# Patient Record
Sex: MALE | Race: WHITE | NOT HISPANIC OR LATINO | Employment: FULL TIME | URBAN - METROPOLITAN AREA
[De-identification: names, ages, dates, MRNs, and addresses within clinical notes are randomized per-mention and may not be internally consistent; named-entity substitution may affect disease eponyms.]

---

## 2024-05-01 ENCOUNTER — TELEPHONE (OUTPATIENT)
Age: 34
End: 2024-05-01

## 2024-05-01 NOTE — TELEPHONE ENCOUNTER
PT call back in regards to coverage. PT at this time is self pay and does not have coverage. Thank you.

## 2024-05-02 ENCOUNTER — OFFICE VISIT (OUTPATIENT)
Dept: FAMILY MEDICINE CLINIC | Facility: CLINIC | Age: 34
End: 2024-05-02

## 2024-05-02 VITALS
OXYGEN SATURATION: 97 % | HEIGHT: 73 IN | WEIGHT: 256 LBS | TEMPERATURE: 97.3 F | HEART RATE: 57 BPM | RESPIRATION RATE: 16 BRPM | DIASTOLIC BLOOD PRESSURE: 70 MMHG | SYSTOLIC BLOOD PRESSURE: 120 MMHG | BODY MASS INDEX: 33.93 KG/M2

## 2024-05-02 DIAGNOSIS — M25.552 PAIN OF LEFT HIP: ICD-10-CM

## 2024-05-02 DIAGNOSIS — Z11.4 SCREENING FOR HIV (HUMAN IMMUNODEFICIENCY VIRUS): ICD-10-CM

## 2024-05-02 DIAGNOSIS — Z00.00 HEALTHCARE MAINTENANCE: ICD-10-CM

## 2024-05-02 DIAGNOSIS — M54.50 LUMBAR BACK PAIN: ICD-10-CM

## 2024-05-02 DIAGNOSIS — Z11.59 NEED FOR HEPATITIS C SCREENING TEST: Primary | ICD-10-CM

## 2024-05-02 DIAGNOSIS — Z13.6 SCREENING FOR CARDIOVASCULAR CONDITION: ICD-10-CM

## 2024-05-02 PROBLEM — F10.10 EXCESSIVE DRINKING ALCOHOL: Status: RESOLVED | Noted: 2019-05-24 | Resolved: 2024-05-02

## 2024-05-02 PROBLEM — I10 HYPERTENSION: Status: RESOLVED | Noted: 2019-05-24 | Resolved: 2024-05-02

## 2024-05-02 PROBLEM — G47.33 MILD OBSTRUCTIVE SLEEP APNEA: Status: RESOLVED | Noted: 2020-06-18 | Resolved: 2024-05-02

## 2024-05-02 PROBLEM — J45.20 ASTHMA, MILD INTERMITTENT: Status: ACTIVE | Noted: 2019-11-14

## 2024-05-02 PROBLEM — I10 HYPERTENSION: Status: ACTIVE | Noted: 2019-05-24

## 2024-05-02 PROBLEM — F10.10 EXCESSIVE DRINKING ALCOHOL: Status: ACTIVE | Noted: 2019-05-24

## 2024-05-02 PROBLEM — G47.33 MILD OBSTRUCTIVE SLEEP APNEA: Status: ACTIVE | Noted: 2020-06-18

## 2024-05-02 PROBLEM — J45.20 ASTHMA, MILD INTERMITTENT: Status: RESOLVED | Noted: 2019-11-14 | Resolved: 2024-05-02

## 2024-05-02 PROCEDURE — 99204 OFFICE O/P NEW MOD 45 MIN: CPT | Performed by: NURSE PRACTITIONER

## 2024-05-02 NOTE — PROGRESS NOTES
INTERNAL MEDICINE INITIAL OFFICE VISIT  [facility] - [facility]    NAME: Arnav Woods  AGE: 34 y.o. SEX: male  : 1990     DATE: 2024     Assessment and Plan:     Problem List Items Addressed This Visit        Surgery/Wound/Pain    Pain of left hip     History of left hip pain off and on.  Saw a chiropractor many years ago.  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 Left hip x ray ordered. May need physical therapy or orthopedics evaluation          Relevant Orders    XR hip/pelv 2-3 vws left if performed    Lumbar back pain     Left sided lumbar back pain which fluctuates. No mechanism of injury. Does not take any medication for pain. Is active with both work and does go to the gym.  Lumbar x ray ordered. May need physical therapy or orthopedic evaluation         Relevant Orders    XR spine lumbar minimum 4 views non injury   Other Visit Diagnoses     Need for hepatitis C screening test    -  Primary    Relevant Orders    Hepatitis C Antibody    Screening for HIV (human immunodeficiency virus)        Relevant Orders    HIV 1/2 AG/AB w Reflex [facility] for 2 yr old and above    Screening for cardiovascular condition        Relevant Orders    Lipid Panel with Direct LDL reflex    Healthcare maintenance        Relevant Orders    CBC and differential    Basic metabolic panel          No follow-ups on file.     Chief Complaint:     Chief Complaint   Patient presents with   • Establish Care   • Hip Pain     Left hip pain      History of Present Illness:   Arnav presents to the office today to establish care.  His past medical history was updated.  The patient has 2 complaints of left lumbar back pain and left hip pain.  Both of these are chronic in nature.  He denies any mechanism of injury.  He has seen a chiropractor in the distant past.  He is not taking any medications for this.  He is quite active with work and also goes to the gym.  X-rays recommended.   Surveillance blood work ordered for screening.  I will contact the patient with the x-ray results to determine the next step.      The following portions of the patient's history were reviewed and updated as appropriate: allergies, current medications, past family history, past medical history, past social history, past surgical history and problem list.     Review of Systems:     Review of Systems   Constitutional: Negative.  Negative for fatigue.   HENT: Negative.  Negative for congestion, postnasal drip, rhinorrhea and trouble swallowing.    Eyes: Negative.  Negative for visual disturbance.   Respiratory: Negative.  Negative for choking and shortness of breath.    Cardiovascular: Negative.  Negative for chest pain.   Gastrointestinal: Negative.    Endocrine: Negative.    Genitourinary: Negative.    Musculoskeletal:  Positive for arthralgias and back pain. Negative for myalgias and neck pain.   Skin: Negative.    Neurological:  Negative for dizziness and headaches.   Psychiatric/Behavioral: Negative.          Past Medical History:     Past Medical History:   Diagnosis Date   • Anxiety    • Lyme disease         Past Surgical History:     Past Surgical History:   Procedure Laterality Date   • NO PAST SURGERIES          Social History:     Social History     Socioeconomic History   • Marital status: Single     Spouse name: None   • Number of children: None   • Years of education: None   • Highest education level: None   Occupational History   • None   Tobacco Use   • Smoking status: Never   • Smokeless tobacco: Never   Vaping Use   • Vaping status: Former   Substance and Sexual Activity   • Alcohol use: Not Currently     Comment: former   • Drug use: Not Currently   • Sexual activity: Yes     Partners: Female   Other Topics Concern   • None   Social History Narrative   • None     Social Determinants of Health     Financial Resource Strain: Not on file   Food Insecurity: Not on file   Transportation Needs: Not on file  "  Physical Activity: Not on file   Stress: Not on file   Social Connections: Not on file   Intimate Partner Violence: Not on file   Housing Stability: Not on file         Family History:     Family History   Problem Relation Age of Onset   • No Known Problems Mother    • Schizophrenia Father         Current Medications:   No current outpatient medications on file.     Allergies:     Allergies   Allergen Reactions   • Levofloxacin Other (See Comments)     dizzziness   • Pollen Extract Allergic Rhinitis        Physical Exam:     /70   Pulse 57   Temp (!) 97.3 °F (36.3 °C) (Temporal)   Resp 16   Ht 6' 1\" (1.854 m)   Wt 116 kg (256 lb)   SpO2 97%   BMI 33.78 kg/m²     Physical Exam  Vitals reviewed.   Constitutional:       General: He is not in acute distress.     Appearance: Normal appearance. He is well-developed. He is obese.   HENT:      Head: Normocephalic and atraumatic.      Right Ear: External ear normal.      Left Ear: External ear normal.      Nose: Nose normal.      Mouth/Throat:      Mouth: Mucous membranes are moist.      Pharynx: Oropharynx is clear. No oropharyngeal exudate.   Eyes:      General:         Right eye: No discharge.         Left eye: No discharge.      Conjunctiva/sclera: Conjunctivae normal.      Pupils: Pupils are equal, round, and reactive to light.   Neck:      Thyroid: No thyromegaly.      Vascular: No JVD.      Trachea: No tracheal deviation.   Cardiovascular:      Rate and Rhythm: Normal rate and regular rhythm.      Pulses: Normal pulses.      Heart sounds: Normal heart sounds. No murmur heard.  Pulmonary:      Effort: Pulmonary effort is normal. No respiratory distress.      Breath sounds: Normal breath sounds. No wheezing.   Abdominal:      General: Bowel sounds are normal.      Palpations: Abdomen is soft.      Tenderness: There is no abdominal tenderness.   Musculoskeletal:      Cervical back: Normal range of motion and neck supple.      Lumbar back: Tenderness " present. Positive left straight leg raise test.      Right hip: Normal.      Left hip: Tenderness and crepitus present. Decreased range of motion.   Lymphadenopathy:      Cervical: No cervical adenopathy.   Skin:     General: Skin is warm and dry.      Capillary Refill: Capillary refill takes less than 2 seconds.   Neurological:      General: No focal deficit present.      Mental Status: He is alert and oriented to person, place, and time. Mental status is at baseline.   Psychiatric:         Mood and Affect: Mood normal.         Behavior: Behavior normal.         Thought Content: Thought content normal.         Judgment: Judgment normal.         Depression Screening and Follow-up Plan: Patient was screened for depression during today's encounter. They screened negative with a PHQ-2 score of 0.      Patient Instructions    website with CLAUDE Santacruz  [facility]

## 2024-05-02 NOTE — ASSESSMENT & PLAN NOTE
History of left hip pain off and on.  Saw a chiropractor many years ago.  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 Left hip x ray ordered. May need physical therapy or orthopedics evaluation

## 2024-05-02 NOTE — ASSESSMENT & PLAN NOTE
Left sided lumbar back pain which fluctuates. No mechanism of injury. Does not take any medication for pain. Is active with both work and does go to the gym.  Lumbar x ray ordered. May need physical therapy or orthopedic evaluation

## 2024-06-19 DIAGNOSIS — Z00.6 ENCOUNTER FOR EXAMINATION FOR NORMAL COMPARISON OR CONTROL IN CLINICAL RESEARCH PROGRAM: ICD-10-CM

## 2024-09-16 ENCOUNTER — HOSPITAL ENCOUNTER (EMERGENCY)
Facility: HOSPITAL | Age: 34
Discharge: HOME | End: 2024-09-16
Attending: EMERGENCY MEDICINE

## 2024-09-16 ENCOUNTER — APPOINTMENT (EMERGENCY)
Dept: RADIOLOGY | Facility: HOSPITAL | Age: 34
End: 2024-09-16

## 2024-09-16 VITALS
TEMPERATURE: 98.2 F | RESPIRATION RATE: 18 BRPM | SYSTOLIC BLOOD PRESSURE: 156 MMHG | HEART RATE: 90 BPM | OXYGEN SATURATION: 98 % | DIASTOLIC BLOOD PRESSURE: 100 MMHG

## 2024-09-16 DIAGNOSIS — S93.401A RIGHT ANKLE SPRAIN: Primary | ICD-10-CM

## 2024-09-16 PROCEDURE — 99283 EMERGENCY DEPT VISIT LOW MDM: CPT

## 2024-09-16 PROCEDURE — 73630 X-RAY EXAM OF FOOT: CPT

## 2024-09-16 PROCEDURE — 99284 EMERGENCY DEPT VISIT MOD MDM: CPT | Performed by: EMERGENCY MEDICINE

## 2024-09-16 PROCEDURE — 73610 X-RAY EXAM OF ANKLE: CPT

## 2024-09-16 NOTE — ED ATTENDING ATTESTATION
9/16/2024  I, Shelton Laird MD, saw and evaluated the patient. I have discussed the patient with the resident/non-physician practitioner and agree with the resident's/non-physician practitioner's findings, Plan of Care, and MDM as documented in the resident's/non-physician practitioner's note, except where noted. All available labs and Radiology studies were reviewed.  I was present for key portions of any procedure(s) performed by the resident/non-physician practitioner and I was immediately available to provide assistance.       At this point I agree with the current assessment done in the Emergency Department.  I have conducted an independent evaluation of this patient a history and physical is as follows:    34-year-old male presents to the emergency department for evaluation of right ankle pain.  Patient states he twisted his ankle and since then has had pain in the lateral aspect of the ankle as well as pain with ambulation.  No associated numbness or tingling.  No proximal pain.  No other injuries.    On exam, patient was comfortably in bed in no acute distress, head is normocephalic atraumatic, pupils equal round and reactive, heart is regular rate and rhythm with intact distal pulses, no increased work of breathing, respiratory distress, or stridor.  He has tenderness to palpation of the lateral malleolus of the right ankle, no obvious deformity, no tenderness to palpation of the fifth metatarsal, no proximal tib-fib tenderness.  Achilles tendon is intact.    Differential diagnosis includes but is not limited to sprain, fracture, dislocation.  Will get x-ray.    X-ray negative for any acute osseous abnormality per my interpretation, patient be discharged home.    ED Course         Critical Care Time  Procedures      
Yes - the patient is able to be screened

## 2024-09-16 NOTE — DISCHARGE INSTRUCTIONS
Please return to the emergency department if you develop new or worsening symptoms including fever, chest pain, shortness of breath, nausea and vomiting that does not resolve on its own.    Please follow-up with your primary care provider for additional care and management of your R ankle sprain.     Please continue to take your home medications as prescribed, please use Motrin / Tylenol as needed for pain control, please use ice for relief of swelling.

## 2024-09-16 NOTE — ED PROVIDER NOTES
1. Right ankle sprain      ED Disposition       ED Disposition   Discharge    Condition   Stable    Date/Time   Mon Sep 16, 2024  6:37 AM    Comment   Arnav Woods discharge to home/self care.                   Assessment & Plan       Medical Decision Making  Patient is a 34 y.o. male with PMH of nothing pertinent who presents to the ED with complaint of ankle sprain.    Vital signs on arrival within normal. On exam patient is seen ambulating with a surgical boot supplied by his girlfriend and crutches supplied by his girlfriend, alert, oriented, no evidence respiratory distress, right ankle with evidence of swelling over the lateral malleoli, no tenderness to palpation of the midfoot, sensation intact, strength intact within the right lower extremity.    History and physical exam most consistent with inversion ankle sprain of the right lower extremity. However, differential diagnosis included but not limited to fracture of the tibia or fibula, fracture of the midfoot, fracture of the ankle. Plan x-ray right ankle, x-ray right foot, pain control as needed    View ED course above for further discussion on patient workup.     X-ray without evidence of acute osseous abnormality, patient remains without loss of strength or sensation, no new onset bruising or worsening swelling    All labs reviewed and utilized in the medical decision making process  All radiology studies independently viewed by me and interpreted by the radiologist.  I reviewed all testing with the patient.     Upon re-evaluation patient has no active pain complaint, denying need for p.o. pain medications, patient has his own cast boot, states he will continue to use it, has his own crutches, states he will continue to use them.    Return precautions given, PCP follow-up recommended.      Amount and/or Complexity of Data Reviewed  Radiology: ordered.                       Medications - No data to display    History of Present Illness       Patient  is a 34-year-old male with no pertinent past medical history who presents emergency department for complaint of a fall.  Reports that last night around 6 PM he was outside attempting to water the grass, reports that he was going down a staircase, slipped/missed a step, suffered an inversion ankle sprain injury.  Reports presentation the emergency department today for worsening pain, concern for possible fracture, states that over the course the evening it swelled, there is no bruising, reports no loss of strength or sensation, able to ambulate with use of crutches, has not used any Motrin or Tylenol denies need for Motrin and Tylenol.            Review of Systems        Objective     ED Triage Vitals [09/16/24 0555]   Temperature Pulse Blood Pressure Respirations SpO2 Patient Position - Orthostatic VS   98.2 °F (36.8 °C) 90 156/100 18 98 % Sitting      Temp Source Heart Rate Source BP Location FiO2 (%) Pain Score    Oral Monitor Right arm -- 7        Physical Exam  Vitals and nursing note reviewed.   Constitutional:       General: He is not in acute distress.     Appearance: He is well-developed.   HENT:      Head: Normocephalic and atraumatic.   Eyes:      Conjunctiva/sclera: Conjunctivae normal.   Cardiovascular:      Rate and Rhythm: Normal rate and regular rhythm.      Heart sounds: No murmur heard.  Pulmonary:      Effort: Pulmonary effort is normal. No respiratory distress.      Breath sounds: Normal breath sounds. No wheezing, rhonchi or rales.   Abdominal:      Palpations: Abdomen is soft.      Tenderness: There is no abdominal tenderness. There is no guarding or rebound.   Musculoskeletal:         General: Swelling, tenderness and signs of injury present.      Cervical back: Neck supple.      Comments: Swelling over the lateral malleoli, pulses intact, strength intact, sensation intact, no tenderness to palpation to midfoot, no tenderness to palpation over the tibia/fibula, no evidence of bruising   Skin:      General: Skin is warm and dry.      Capillary Refill: Capillary refill takes less than 2 seconds.   Neurological:      Mental Status: He is alert.   Psychiatric:         Mood and Affect: Mood normal.         Labs Reviewed - No data to display  XR ankle 3+ views RIGHT    (Results Pending)   XR foot 3+ views RIGHT    (Results Pending)       Procedures         Chet Grimes DO  09/16/24 0641

## 2025-02-03 ENCOUNTER — HOSPITAL ENCOUNTER (OUTPATIENT)
Dept: RADIOLOGY | Facility: HOSPITAL | Age: 35
Discharge: HOME | End: 2025-02-03
Payer: COMMERCIAL

## 2025-02-03 ENCOUNTER — APPOINTMENT (OUTPATIENT)
Dept: LAB | Facility: CLINIC | Age: 35
End: 2025-02-03
Payer: COMMERCIAL

## 2025-02-03 ENCOUNTER — OFFICE VISIT (OUTPATIENT)
Dept: FAMILY MEDICINE CLINIC | Facility: CLINIC | Age: 35
End: 2025-02-03
Payer: COMMERCIAL

## 2025-02-03 VITALS
TEMPERATURE: 98 F | OXYGEN SATURATION: 98 % | SYSTOLIC BLOOD PRESSURE: 118 MMHG | DIASTOLIC BLOOD PRESSURE: 72 MMHG | RESPIRATION RATE: 18 BRPM | HEART RATE: 51 BPM | HEIGHT: 73 IN | BODY MASS INDEX: 36.29 KG/M2 | WEIGHT: 273.8 LBS

## 2025-02-03 DIAGNOSIS — M54.50 LUMBAR BACK PAIN: ICD-10-CM

## 2025-02-03 DIAGNOSIS — Z11.4 SCREENING FOR HIV (HUMAN IMMUNODEFICIENCY VIRUS): ICD-10-CM

## 2025-02-03 DIAGNOSIS — M25.552 PAIN OF LEFT HIP: ICD-10-CM

## 2025-02-03 DIAGNOSIS — Z11.59 NEED FOR HEPATITIS C SCREENING TEST: ICD-10-CM

## 2025-02-03 DIAGNOSIS — Z00.00 HEALTHCARE MAINTENANCE: ICD-10-CM

## 2025-02-03 DIAGNOSIS — M25.532 PAIN IN BOTH WRISTS: ICD-10-CM

## 2025-02-03 DIAGNOSIS — Z13.6 SCREENING FOR CARDIOVASCULAR CONDITION: ICD-10-CM

## 2025-02-03 DIAGNOSIS — Z13.29 SCREENING FOR THYROID DISORDER: ICD-10-CM

## 2025-02-03 DIAGNOSIS — Z13.1 SCREENING FOR DIABETES MELLITUS: ICD-10-CM

## 2025-02-03 DIAGNOSIS — M25.531 PAIN IN BOTH WRISTS: ICD-10-CM

## 2025-02-03 DIAGNOSIS — Z00.6 ENCOUNTER FOR EXAMINATION FOR NORMAL COMPARISON OR CONTROL IN CLINICAL RESEARCH PROGRAM: ICD-10-CM

## 2025-02-03 DIAGNOSIS — Z13.29 SCREENING FOR THYROID DISORDER: Primary | ICD-10-CM

## 2025-02-03 LAB
ANION GAP SERPL CALCULATED.3IONS-SCNC: 7 MMOL/L (ref 4–13)
BASOPHILS # BLD AUTO: 0.04 THOUSANDS/ΜL (ref 0–0.1)
BASOPHILS NFR BLD AUTO: 1 % (ref 0–1)
BUN SERPL-MCNC: 11 MG/DL (ref 5–25)
CALCIUM SERPL-MCNC: 9.6 MG/DL (ref 8.4–10.2)
CHLORIDE SERPL-SCNC: 105 MMOL/L (ref 96–108)
CHOLEST SERPL-MCNC: 176 MG/DL (ref ?–200)
CO2 SERPL-SCNC: 30 MMOL/L (ref 21–32)
CREAT SERPL-MCNC: 0.86 MG/DL (ref 0.6–1.3)
EOSINOPHIL # BLD AUTO: 0.19 THOUSAND/ΜL (ref 0–0.61)
EOSINOPHIL NFR BLD AUTO: 3 % (ref 0–6)
ERYTHROCYTE [DISTWIDTH] IN BLOOD BY AUTOMATED COUNT: 11.8 % (ref 11.6–15.1)
EST. AVERAGE GLUCOSE BLD GHB EST-MCNC: 114 MG/DL
GFR SERPL CREATININE-BSD FRML MDRD: 113 ML/MIN/1.73SQ M
GLUCOSE P FAST SERPL-MCNC: 80 MG/DL (ref 65–99)
HBA1C MFR BLD: 5.6 %
HCT VFR BLD AUTO: 40.5 % (ref 36.5–49.3)
HCV AB SER QL: NORMAL
HDLC SERPL-MCNC: 29 MG/DL
HGB BLD-MCNC: 13.8 G/DL (ref 12–17)
HIV 1+2 AB+HIV1 P24 AG SERPL QL IA: NORMAL
HIV 2 AB SERPL QL IA: NORMAL
HIV1 AB SERPL QL IA: NORMAL
HIV1 P24 AG SERPL QL IA: NORMAL
IMM GRANULOCYTES # BLD AUTO: 0.01 THOUSAND/UL (ref 0–0.2)
IMM GRANULOCYTES NFR BLD AUTO: 0 % (ref 0–2)
LDLC SERPL CALC-MCNC: 128 MG/DL (ref 0–100)
LYMPHOCYTES # BLD AUTO: 2.39 THOUSANDS/ΜL (ref 0.6–4.47)
LYMPHOCYTES NFR BLD AUTO: 41 % (ref 14–44)
MCH RBC QN AUTO: 30.5 PG (ref 26.8–34.3)
MCHC RBC AUTO-ENTMCNC: 34.1 G/DL (ref 31.4–37.4)
MCV RBC AUTO: 90 FL (ref 82–98)
MONOCYTES # BLD AUTO: 0.39 THOUSAND/ΜL (ref 0.17–1.22)
MONOCYTES NFR BLD AUTO: 7 % (ref 4–12)
NEUTROPHILS # BLD AUTO: 2.87 THOUSANDS/ΜL (ref 1.85–7.62)
NEUTS SEG NFR BLD AUTO: 48 % (ref 43–75)
NRBC BLD AUTO-RTO: 0 /100 WBCS
PLATELET # BLD AUTO: 267 THOUSANDS/UL (ref 149–390)
PMV BLD AUTO: 9.8 FL (ref 8.9–12.7)
POTASSIUM SERPL-SCNC: 4.8 MMOL/L (ref 3.5–5.3)
RBC # BLD AUTO: 4.52 MILLION/UL (ref 3.88–5.62)
SODIUM SERPL-SCNC: 142 MMOL/L (ref 135–147)
TRIGL SERPL-MCNC: 94 MG/DL (ref ?–150)
TSH SERPL DL<=0.05 MIU/L-ACNC: 1.41 UIU/ML (ref 0.45–4.5)
WBC # BLD AUTO: 5.89 THOUSAND/UL (ref 4.31–10.16)

## 2025-02-03 PROCEDURE — 80061 LIPID PANEL: CPT

## 2025-02-03 PROCEDURE — 73502 X-RAY EXAM HIP UNI 2-3 VIEWS: CPT

## 2025-02-03 PROCEDURE — 87389 HIV-1 AG W/HIV-1&-2 AB AG IA: CPT

## 2025-02-03 PROCEDURE — 99214 OFFICE O/P EST MOD 30 MIN: CPT | Performed by: NURSE PRACTITIONER

## 2025-02-03 PROCEDURE — 36415 COLL VENOUS BLD VENIPUNCTURE: CPT

## 2025-02-03 PROCEDURE — 99395 PREV VISIT EST AGE 18-39: CPT | Performed by: NURSE PRACTITIONER

## 2025-02-03 PROCEDURE — 86803 HEPATITIS C AB TEST: CPT

## 2025-02-03 PROCEDURE — 83036 HEMOGLOBIN GLYCOSYLATED A1C: CPT

## 2025-02-03 PROCEDURE — 80048 BASIC METABOLIC PNL TOTAL CA: CPT

## 2025-02-03 PROCEDURE — 84443 ASSAY THYROID STIM HORMONE: CPT

## 2025-02-03 PROCEDURE — 85025 COMPLETE CBC W/AUTO DIFF WBC: CPT

## 2025-02-03 PROCEDURE — 72110 X-RAY EXAM L-2 SPINE 4/>VWS: CPT

## 2025-02-03 NOTE — PATIENT INSTRUCTIONS
Wegovy or zepbound        Patient Education     Carpal Tunnel Exercises   About this topic   Carpal tunnel syndrome is a very common health problem. It is most often caused by doing hand or wrist movements over and over. It can also be caused by using the lower arm muscles too much.  The carpal tunnel is the small area in your wrist that your median nerve runs through. A tough band of tissues called a ligament holds everything in place over the carpal tunnel. Your median nerve runs from your neck through your lower arm into your hand. If this nerve is squeezed at the wrist area, you may feel pain and have other signs. Your hand, fingers, and wrist may feel weak, numb, or tingly. This is called carpal tunnel syndrome.  Your doctor may want you to try exercises to help your signs. Other times, you will do these exercises after surgery.  General   Before starting with a program, ask your doctor if you are healthy enough to do these exercises. Your doctor may have you work with a  or physical therapist to make a safe exercise program to meet your needs.  Stretching Exercises   Stretching exercises keep your muscles flexible. They also stop them from getting tight. Start by doing each of these stretches 2 to 3 times. In order for your body to make changes, you will need to hold these stretches for 20 to 30 seconds. Repeat each exercise 2 to 3 times each day. Do all exercises slowly.  Wrist stretches bending back ? Straighten your elbow and have your palm facing up. Keeping your elbow straight, bend your wrist back so that your fingers are now pointing to the floor. Grab your hand with your other hand and push back the wrist until you feel a stretch. If you just had surgery, you should not do this exercise until your therapist or doctor tells you it is OK.  Strengthening Exercises   Strengthening exercises keep your muscles firm and strong. Sit while doing these exercises. Be sure to use good posture. Start by  repeating each exercise 2 to 3 times. Work up to doing each exercise 10 times. Try to do the exercises 2 to 3 times each day. Do all exercises slowly.  Tendon gliding exercises using 4 positions ? Start by holding your hand with your fingers straight. Then, bend only the last two joints of your fingers and move your fingers into a hook or claw position. Next, straighten your fingers and bend your knuckles to make a flat table top position. This is also called the duckbill position. Last, make a full fist. Moving your hand into all 4 positions is one exercise.  Wrist exercises:  Side-to-side ? Hold one arm still using your other hand. Move your hand from side to side.  Up and down ? Hold one arm still using your other hand. Bend your wrist up and down.  Wrist circles ? Move each wrist in a [place] in one direction. Now, move each wrist in a [place] in the other direction.           What will the results be?   Less pain, pressure, stiffness, and swelling in your wrist and hand  Ease numbness and tingling in your hand and fingers  Increased blood flow to the nerves, muscles, and joints of your wrist and hand to help healing  Increased hand and  strength  Keep your muscles and joints strong and flexible  Helpful tips   Stay active and work out to keep your muscles strong and flexible.  Be sure you do not hold your breath when exercising. This can raise your blood pressure. If you tend to hold your breath, try counting out loud when exercising. If any exercise bothers you, stop right away.  Always warm up before stretching. Heated muscles stretch much easier than cool muscles. Stretching cool muscles can lead to injury.  Try walking and swinging your arms at an easy pace for a few minutes to warm up your muscles. Do this again after exercising.  Never bounce when doing stretches.  Doing exercises before a meal may be a good way to get into a routine.  Exercise may be slightly uncomfortable, but you should not have  "sharp pains. If you do get sharp pains, stop what you are doing. If the sharp pains continue, call your doctor.  Last Reviewed Date   2021-05-10  Consumer Information Use and Disclaimer   This generalized information is a limited summary of diagnosis, treatment, and/or medication information. It is not meant to be comprehensive and should be used as a tool to help the user understand and/or assess potential diagnostic and treatment options. It does NOT include all information about conditions, treatments, medications, side effects, or risks that may apply to a specific patient. It is not intended to be medical advice or a substitute for the medical advice, diagnosis, or treatment of a health care provider based on the health care provider's examination and assessment of a patient’s specific and unique circumstances. Patients must speak with a health care provider for complete information about their health, medical questions, and treatment options, including any risks or benefits regarding use of medications. This information does not endorse any treatments or medications as safe, effective, or approved for treating a specific patient. Up[employer] Inc[employer] and its affiliates disclaim any warranty or liability relating to this information or the use thereof. The use of this information is governed by the Terms of Use, available at https://www.[employer].com/en/know/clinical-effectiveness-terms   Copyright   Copyright © 2024 [employer], Inc. and its affiliates and/or licensors. All rights reserved.  Patient Education     Weight Loss Diet   About this topic   There are many \"trendy\" weight loss diets that are popular today. Many of these diets can end up being more harmful than helpful. The healthiest way to lose weight is to burn more calories than you eat.  A weight loss diet should help you have a healthy view of eating. It is NOT healthy to stop eating to try and lose weight. A good diet plan will help you cut down your " food intake and make healthy choices.  A healthy weight loss goal is 1 to 2 pounds (0.5 to 1 kg) per week. Reducing calories in your diet, burning calories through exercise, or both can help you lose weight. Combining a healthy diet with regular physical activity can help you get the best results.  To cut calories in your diet you can:  Switch from whole milk to 1% or skim milk.  Switch from regular cheese to low-fat or fat-free cheese.  Use healthier condiment choices:  Fat-free or low-fat sour cream or salad dressings  Spray butter  Diet syrups or jellies over regular  Try frozen yogurt as a dessert rather than eating ice cream.  Skip the chips. Snack on carrots, vegetables, or fruit. If chips are a favorite of yours, try the baked style and watch portion size.  Eat grilled, roasted, boiled, broiled, or baked meats. Avoid deep-frying. Choose skinless poultry, lean red meat, lean cuts of pork, and fish for good protein sources.  Try flavored no-calorie aguero. Do not drink soda and juices that have many calories.  Choose fruit instead of sweets.  General   Eating smaller meals more often may be helpful. This will keep you from overeating at your next meal. Also, eating meals slowly helps you feel full faster.  If eating 3 meals is a part of your lifestyle, choose more lean proteins and higher fiber foods to fill you up at each meal.  Do not skip meals. Most often if you skip a meal, you eat too much at the next meal.  Eat smaller portions. Use a smaller plate or bowl for meals, and when you are eating out, eat half and take the rest home.  Plan ahead. Plan your meals and grocery list before going to the store. Planning will keep you from getting meals from restaurants.  Do not go to the grocery store hungry. You are more likely to buy snacks that are not good for you.  Portion out snacks. When you are having a snack, instead of grabbing the whole bag, portion a small amount out to give yourself a stopping  point.  Drink water before and after your meals to help fill you up without the calories.  When eating starchy foods, choose whole-grain products. These have a lot of fiber which will make you feel full. Fiber also helps lower cholesterol and helps with bowel function.  If you need a helpful start, ask your doctor to send you to a dietitian for weight loss help.         What will the results be?   Losing excess weight will make your whole body healthier. You will have more energy for your daily activities and lower your risk for health problems.  What lifestyle changes are needed?   Stay active. Eating healthy is not always enough to lose weight. Burning calories by exercising is a big part of weight loss.  What foods are good to eat?   The key is to watch your portion sizes. It is best to choose foods that are lower in fat and calories.  Choose lean meats:  Boneless, skinless chicken breast  Pork loin  90% lean beef  Lean turkey meat  Fresh fish (not fried)  Choose low-fat dairy products:  1% or skim milk  Spray butter or margarine  Low-fat or fat-free cheese  Frozen yogurt or low-calorie ice cream  Choose fresh fruits, vegetables, beans and lentils, and whole wheat products more often.  Choose water to drink more often. Drink diet or no-calorie beverages when you want something other than water. Aim to get your calories from the foods you eat.  Choose smart snacks:  Fruits  Vegetables  Low-fat or nonfat yogurt  Low-fat or no-fat cheese, such as cottage cheese  Unsalted nuts  Hard-boiled egg  Hummus  Guacamole  Natural peanut butter  Popcorn with no butter ? use pepper, garlic, or another spice to taste  Whole grain crackers  What foods should be limited or avoided?   Limit high-fat, high-sodium, and high-calorie foods like:  Fried foods  Processed meats  Whole-fat dairy products  Candy, cookies, chips, pastries  Sausage, torres, any full-fat meats  Soda, juice  Beer, wine, and mixed drinks (alcohol)  Will there be  any other care needed?   What do I do first before trying to lose weight?  Talk to your doctor and dietitian to see if you need to lose weight. Work with them to set your weight loss goals.  If you have a chronic illness, such as high blood sugar or high blood pressure, ask a doctor or dietitian what diet and exercise is right for you.  Ask your doctor about how much you are able to exercise and what type of exercise is good for you.  Helpful tips   Keep a food journal to help keep you on track.  Join a support group.  Tips for burning calories:  If your workplace is near your house, choose to walk or bike to work instead of driving.  Take 20-minute walks each day. Walk around during your lunch break. You will not only burn calories, but will raise your energy for the rest of the day.  Take the stairs over the elevator.  Join a gym or exercise class with a friend.  Try to exercise 30 minutes a day for overall health. Three 10-minute sessions work too. Aim for 60 to 90 minutes a day to lose weight.  Drink lots of water before, during, and after exercise.  Last Reviewed Date   2021-06-24  Consumer Information Use and Disclaimer   This generalized information is a limited summary of diagnosis, treatment, and/or medication information. It is not meant to be comprehensive and should be used as a tool to help the user understand and/or assess potential diagnostic and treatment options. It does NOT include all information about conditions, treatments, medications, side effects, or risks that may apply to a specific patient. It is not intended to be medical advice or a substitute for the medical advice, diagnosis, or treatment of a health care provider based on the health care provider's examination and assessment of a patient’s specific and unique circumstances. Patients must speak with a health care provider for complete information about their health, medical questions, and treatment options, including any risks or  "benefits regarding use of medications. This information does not endorse any treatments or medications as safe, effective, or approved for treating a specific patient. UpToDate, Inc. and its affiliates disclaim any warranty or liability relating to this information or the use thereof. The use of this information is governed by the Terms of Use, available at https://www.[employer]/en/know/clinical-effectiveness-terms   Copyright   Copyright © 2024 UpToDate, Inc. and its affiliates and/or licensors. All rights reserved.  Patient Education     Low-carbohydrate diet   The Basics   Written by the doctors and editors at [employer]   What is a low-carbohydrate diet? -- A low-carbohydrate, or \"low-carb,\" diet involves eating foods that are high in protein and moderate in healthy fats. Most low-carb diets limit foods that are high in added sugar or carbohydrates. These are foods like sweets, starches, and refined grains.  For some people, following a low-carb diet might help them:   Lose weight   Manage their blood sugar  With a low-carb diet, many people try to eat between 60 and 130 grams of carbs per day. For foods with labels, reading the label can tell you how many grams of carbs are in a serving (figure 1).  What can I eat and drink on a low-carb diet? -- While eating a low-carb diet, it's important to make sure that you are getting enough of the nutrients your body needs. Some kinds of proteins and fats are better for your health than others. The same is true for foods with carbs.  Meat and fish have very few carbs in them. Fats also have very few carbs in them. However, it is important to make healthy choices for fats and proteins:   Choose \"healthy\" fats - These are also called \"monounsaturated\" or \"polyunsaturated\" fats. They tend to be more liquid at room temperature. Healthy fats are found in things like olive oil, canola oil, and sesame oil. They are also found in nuts, seeds, avocados, and nut butters.   " Choose lean meats, poultry, seafood, and proteins - Eat fish, chicken, pork, and beef. Eggs, seeds, nuts and nut butters, and cheese are also healthy choices.  Eat non-starchy, colorful vegetables like lettuce, spinach, broccoli, cauliflower, zucchini, and cabbage. Fruits like watermelon, peaches, and berries are also lower in carbs. Choose whole grains because they provide fiber.  What foods and drinks should I avoid on a low-carb diet? -- Avoid or limit foods that are high in added sugar. Many alcoholic drinks also contain carbs and should be limited.   Grains to limit oravoid - Grains made with refined flour. Examples include breads, chips, cookies, cakes, candy, doughnuts, granola, baked goods, muffins, pizza dough, and pie crusts that are packaged.   Fruits to limit oravoid - Fruits high in carbs. Examples include dried fruits, fruit juices, and processed fruit products.   Vegetables to limit oravoid - Starchy vegetables. Examples include white potatoes, corn, and turnips.  All topics are updated as new evidence becomes available and our peer review process is complete.  This topic retrieved from [employer] on: May 15, 2024.  Topic 116352 Version 1.0  Release: 32.4.3 - C32.134  © 2024 UpToDate, Inc. and/or its affiliates. All rights reserved.  figure 1: Food label     Graphic 516478 Version 1.0  Consumer Information Use and Disclaimer   Disclaimer: This generalized information is a limited summary of diagnosis, treatment, and/or medication information. It is not meant to be comprehensive and should be used as a tool to help the user understand and/or assess potential diagnostic and treatment options. It does NOT include all information about conditions, treatments, medications, side effects, or risks that may apply to a specific patient. It is not intended to be medical advice or a substitute for the medical advice, diagnosis, or treatment of a health care provider based on the health care provider's examination  and assessment of a patient's specific and unique circumstances. Patients must speak with a health care provider for complete information about their health, medical questions, and treatment options, including any risks or benefits regarding use of medications. This information does not endorse any treatments or medications as safe, effective, or approved for treating a specific patient. UpToDate, Inc. and its affiliates disclaim any warranty or liability relating to this information or the use thereof.The use of this information is governed by the Terms of Use, available at https://www.[employer].com/en/know/clinical-effectiveness-terms. 2024© UpToDate, Inc. and its affiliates and/or licensors. All rights reserved.  Copyright   © 2024 UpToDate, Inc. and/or its affiliates. All rights reserved.

## 2025-02-03 NOTE — ASSESSMENT & PLAN NOTE
Patient reports pain in his bilateral wrists with the right being worse than the left.  He is a  and does perform repetitive motions.  He is also lifting weights 3 times per week.  He does have some numbness and tingling.  I did recommend the patient perform some wrist exercises and these were provided to him on his after visit summary.  He could also purchase bilateral wrist braces.  Ultrasound carpal tunnel order.  Orders:    US Carpal Tunnel; Future

## 2025-02-03 NOTE — ASSESSMENT & PLAN NOTE
Orders:    XR spine lumbar minimum 4 views non injury; Future    Ambulatory Referral to Physical Therapy; Future

## 2025-02-03 NOTE — ASSESSMENT & PLAN NOTE
Patient has gained approximately 17 pounds since last year.  He is concerned about his weight.  He states he does go to the gym approximately 3 days a week.  He is focusing more on weight lifting.  He feels he is eating a healthy diet.  He is not performing a lot of cardio.  He is questioning whether or not he is a candidate for the injectable medications.  I did provide him with the names of Wegovy and Zepbound for him to check with his insurance carrier to see if these are covered expense.  Surveillance blood work ordered to make sure there is no metabolic reason for weight gain

## 2025-02-03 NOTE — ASSESSMENT & PLAN NOTE
"Patient continues with left hip pain.  He denies any mechanism of injury.  He states he does hear a \"popping and cracking\" sound with certain movements.  He denies any mechanism of injury.  He does report constant discomfort in the left hip at 1-3/10.  At its worst the pain is an 8 out of 10.  The pain sometimes does increase after exercising.  He mainly is performing weight lifting.  X-ray ordered.  Refer to physical therapy.  Orders:    XR hip/pelv 2-3 vws left if performed; Future    Ambulatory Referral to Physical Therapy; Future    "

## 2025-02-03 NOTE — PROGRESS NOTES
"Adult Annual Physical  Name: Arnav Woods      : 1990      MRN: [id number]  Encounter Provider: CLAUDE Gandhi  Encounter Date: 2/3/2025   Encounter department: [facility]    Assessment & Plan  Screening for cardiovascular condition    Orders:    Lipid Panel with Direct LDL reflex; Future    Healthcare maintenance    Orders:    Basic metabolic panel; Future    CBC and differential; Future    Screening for HIV (human immunodeficiency virus)    Orders:    HIV 1/2 AG/AB w Reflex [facility] for 2 yr old and above; Future    Need for hepatitis C screening test    Orders:    Hepatitis C Antibody; Future    Screening for thyroid disorder    Orders:    TSH, 3rd generation with Free T4 reflex; Future    Screening for diabetes mellitus    Orders:    Hemoglobin A1C; Future    Lumbar back pain    Orders:    XR spine lumbar minimum 4 views non injury; Future    Ambulatory Referral to Physical Therapy; Future    Pain of left hip  Patient continues with left hip pain.  He denies any mechanism of injury.  He states he does hear a \"popping and cracking\" sound with certain movements.  He denies any mechanism of injury.  He does report constant discomfort in the left hip at 1-3/10.  At its worst the pain is an 8 out of 10.  The pain sometimes does increase after exercising.  He mainly is performing weight lifting.  X-ray ordered.  Refer to physical therapy.  Orders:    XR hip/pelv 2-3 vws left if performed; Future    Ambulatory Referral to Physical Therapy; Future    Pain in both wrists  Patient reports pain in his bilateral wrists with the right being worse than the left.  He is a  and does perform repetitive motions.  He is also lifting weights 3 times per week.  He does have some numbness and tingling.  I did recommend the patient perform some wrist exercises and these were provided to him on his after visit summary.  He could also purchase bilateral wrist braces.  Ultrasound carpal " "tunnel order.  Orders:    US Carpal Tunnel; Future    BMI 36.0-36.9,adult  Patient has gained approximately 17 pounds since last year.  He is concerned about his weight.  He states he does go to the gym approximately 3 days a week.  He is focusing more on weight lifting.  He feels he is eating a healthy diet.  He is not performing a lot of cardio.  He is questioning whether or not he is a candidate for the injectable medications.  I did provide him with the names of Wegovy and Zepbound for him to check with his insurance carrier to see if these are covered expense.  Surveillance blood work ordered to make sure there is no metabolic reason for weight gain         Immunizations and preventive care screenings were discussed with patient today. Appropriate education was printed on patient's after visit summary.    Counseling:  Alcohol/drug use: discussed moderation in alcohol intake, the recommendations for healthy alcohol use, and avoidance of illicit drug use.  Dental Health: discussed importance of regular tooth brushing, flossing, and dental visits.  Injury prevention: discussed safety/seat belts, safety helmets, smoke detectors, carbon monoxide detectors, and smoking near bedding or upholstery.  Sexual health: discussed sexually transmitted diseases, partner selection, use of condoms, avoidance of unintended pregnancy, and contraceptive alternatives.  Exercise: the importance of regular exercise/physical activity was discussed. Recommend exercise 3-5 times per week for at least 30 minutes.       Depression Screening and Follow-up Plan: Patient was screened for depression during today's encounter. They screened negative with a PHQ-2 score of 0.        History of Present Illness     Adult Annual Physical:  Patient presents for annual physical. Here  for annual physical and follow up. Left hip pain continues. Reports constant discomfort, range of motion issues, \"popping and cracking\", internal rotation painful. " Constant 1-3/10. At its worst 8-9/10.  Not taking anything over the counter for pain. No recent injury. Does weight lifting and cardio.  Sometimes pain increased after exercising.  Weight gain 17 pounds since last year. Eats healthy diet.  Also concerned with bilateral wrist pain with some numbness and tingling.  He does work as a ..     Diet and Physical Activity:  - Diet/Nutrition: well balanced diet, consuming 3-5 servings of fruits/vegetables daily, low carb diet and limited junk food.  - Exercise: moderate cardiovascular exercise and 3-4 times a week on average. eats more vegetables than fruit    Depression Screening:  - PHQ-2 Score: 0    General Health:  - Sleep: sleeps well and 7-8 hours of sleep on average.  - Hearing: normal hearing bilateral ears.  - Vision: wears glasses, wears contacts and most recent eye exam > 1 year ago.  - Dental: regular dental visits and brushes teeth twice daily.     Health:  - History of STDs: no.   - Urinary symptoms: none.     Review of Systems   Constitutional: Negative.  Negative for fatigue.   HENT: Negative.  Negative for congestion, postnasal drip, rhinorrhea and trouble swallowing.    Eyes: Negative.  Negative for visual disturbance.   Respiratory: Negative.  Negative for choking and shortness of breath.    Cardiovascular: Negative.  Negative for chest pain.   Gastrointestinal: Negative.    Endocrine: Negative.    Genitourinary: Negative.    Musculoskeletal:  Positive for back pain. Negative for arthralgias (Left hip), myalgias and neck pain.   Skin: Negative.    Neurological:  Positive for numbness (Bilateral wrists and hands.  Occasionally.). Negative for dizziness and headaches.   Psychiatric/Behavioral: Negative.       No current outpatient medications on file prior to visit.     No current facility-administered medications on file prior to visit.      Social History     Tobacco Use    Smoking status: Never     Passive exposure: Never    Smokeless tobacco:  "Never   Vaping Use    Vaping status: Former   Substance and Sexual Activity    Alcohol use: Not Currently     Comment: former    Drug use: Not Currently    Sexual activity: Yes     Partners: Female       Objective   /72   Pulse (!) 51   Temp 98 °F (36.7 °C) (Temporal)   Resp 18   Ht 6' 1\" (1.854 m)   Wt 124 kg (273 lb 12.8 oz)   SpO2 98%   BMI 36.12 kg/m²     Physical Exam  Vitals and nursing note reviewed.   Constitutional:       Appearance: Normal appearance. He is well-developed. He is obese.   HENT:      Head: Normocephalic and atraumatic.      Right Ear: Tympanic membrane, ear canal and external ear normal. There is no impacted cerumen.      Left Ear: Tympanic membrane, ear canal and external ear normal. There is no impacted cerumen.      Nose: Nose normal.      Mouth/Throat:      Mouth: Mucous membranes are moist.      Pharynx: Oropharynx is clear.   Eyes:      Extraocular Movements: Extraocular movements intact.      Conjunctiva/sclera: Conjunctivae normal.      Pupils: Pupils are equal, round, and reactive to light.   Cardiovascular:      Rate and Rhythm: Normal rate and regular rhythm.      Pulses: Normal pulses.      Heart sounds: Normal heart sounds. No murmur heard.  Pulmonary:      Effort: Pulmonary effort is normal. No respiratory distress.      Breath sounds: Normal breath sounds.   Abdominal:      General: Bowel sounds are normal. There is no distension.      Palpations: Abdomen is soft. There is no mass.      Tenderness: There is no abdominal tenderness. There is no guarding or rebound.      Hernia: No hernia is present.   Musculoskeletal:      Right wrist: Decreased range of motion.      Left wrist: Decreased range of motion.      Cervical back: Normal range of motion and neck supple.      Left hip: Bony tenderness and crepitus present. Decreased range of motion.      Comments: Numbness and tingling   Skin:     General: Skin is warm and dry.   Neurological:      General: No focal " deficit present.      Mental Status: He is alert and oriented to person, place, and time. Mental status is at baseline.   Psychiatric:         Mood and Affect: Mood normal.         Behavior: Behavior normal.         Thought Content: Thought content normal.         Judgment: Judgment normal.

## 2025-02-05 ENCOUNTER — RESULTS FOLLOW-UP (OUTPATIENT)
Dept: FAMILY MEDICINE CLINIC | Facility: CLINIC | Age: 35
End: 2025-02-05

## 2025-02-05 DIAGNOSIS — M16.0 BILATERAL HIP JOINT ARTHRITIS: Primary | ICD-10-CM

## 2025-02-06 NOTE — PROGRESS NOTES
PT Evaluation     Today's date: 2025  Patient name: Arnav Woods  : 1990  MRN: [id number]  Referring provider: Wendy Phillips CRNP  Dx:   Encounter Diagnosis     ICD-10-CM    1. Lumbar back pain  M54.50 Ambulatory Referral to Physical Therapy      2. Pain of left hip  M25.552 Ambulatory Referral to Physical Therapy                     Assessment  Impairments: abnormal gait, abnormal or restricted ROM, impaired physical strength, lacks appropriate home exercise program, pain with function and poor posture   Symptom irritability: low    Assessment details: Pt is a 34 y.o. male presenting to PT with chief complaint of chronic L hip pain. PT findings include: limited hip ROM in all directions (with firm end feel and pain), hip joint hypomobility, strength deficits of hip abductor/ER/ext. Pt also positive for hip special tests which is consistent with x-ray findings. Likely primary pain generator is at the hip joint; he does have good flexibility of HS and quad, slight tightness in L hip flexor. Pt educated on PT findings, anatomy, biomechanics, POC and rehab course. Pt will benefit from 1 additional visit to provide updated HEP and educate patient on slowing OA of hip and stabilize hip.   Understanding of Dx/Px/POC: good     Prognosis: good    Goals  1. Pt will demonstrate understanding of HEP and POC in order to improve compliance with course of rehab in 2 weeks.  2. Pt's hip flex ROM will improve by 20° allowing pt to perform squatting activities with less restriction in 4 weeks.   3. 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   4. Pt's pain will be 2/10 or less to allow pt to return to PLOF with least restriction by d/c.     Plan  Patient would benefit from: skilled physical therapy  Planned modality interventions: low level laser therapy    Planned therapy interventions: joint mobilization, manual therapy, neuromuscular re-education,  strengthening, stretching, therapeutic activities, therapeutic exercise, home exercise program, functional ROM exercises and flexibility    Therapy duration (weeks): 2 visits to establish HEP.  Treatment plan discussed with: patient      Subjective Evaluation    History of Present Illness  Mechanism of injury: Pt arrives to PT via referral from family medicine for chronic L hip pain (reporting that this started in his 20's). Pt had x-rays taken with findings of moderate OA of B hips and facet disease of lower lumbar. Pt works as ; he tries to lift at the gym 3x/week. His primary complaint is pain in the anterior hip, difficulty with transfers from the floor to stand, and also significant stiffness.     Pt states that he stretches the hips, yoga (2x/day) but despite this, he reports stiffness returns the next day.       Quality of life: good    Patient Goals  Patient goals for therapy: decreased pain, increased motion, independence with ADLs/IADLs and increased strength    Pain  At best pain rating: 3  At worst pain ratin  Location: L hip  Quality: dull ache, discomfort, grinding and tight      Diagnostic Tests  X-ray: abnormal        Objective    Flowsheet Rows      Flowsheet Row Most Recent Value   PT/OT G-Codes    Current Score 49   Projected Score 64          Hip ROM:  Flex: 105°/90° (pain, firm end feel)  ER: 60°/45° (pain, firm end feel)  IR: 20°/0° (pain, firm end feel)    Quad flexibility: good  HS flexibility: good    Hip mobility:  PA: normal  AP: hypo  Inf: hypo    Hip strength:  Hip abd: 4+/5  Hip ER: 5/5  Hip ext: 3+/5    Squat: good, poor depth due to hip restriction  Lunge: good       Precautions: hip OA       Manuals                                                                 Neuro Re-Ed                                                                                                        Ther Ex             SLR  HEP           Hip abduction  HEP           clamshell  HEP            bridge  Pball HS curl           Bridge march             Hip flexor stretch  HEP           Lateral band walk             Hip adduction lift             Standing fire hydrant             U/l leg press  Educate to perform at [facility]           Reverse clamshell                                       Ther Activity                                       Gait Training                                       Modalities

## 2025-02-07 ENCOUNTER — EVALUATION (OUTPATIENT)
Dept: PHYSICAL THERAPY | Facility: CLINIC | Age: 35
End: 2025-02-07
Payer: COMMERCIAL

## 2025-02-07 DIAGNOSIS — M25.552 PAIN OF LEFT HIP: ICD-10-CM

## 2025-02-07 DIAGNOSIS — M54.50 LUMBAR BACK PAIN: ICD-10-CM

## 2025-02-07 PROCEDURE — 97161 PT EVAL LOW COMPLEX 20 MIN: CPT | Performed by: PHYSICAL THERAPIST

## 2025-02-13 ENCOUNTER — OFFICE VISIT (OUTPATIENT)
Dept: PHYSICAL THERAPY | Facility: CLINIC | Age: 35
End: 2025-02-13
Payer: COMMERCIAL

## 2025-02-13 DIAGNOSIS — M54.50 LUMBAR BACK PAIN: Primary | ICD-10-CM

## 2025-02-13 DIAGNOSIS — M25.552 PAIN OF LEFT HIP: ICD-10-CM

## 2025-02-13 LAB
APOB+LDLR+PCSK9 GENE MUT ANL BLD/T: NOT DETECTED
BRCA1+BRCA2 DEL+DUP + FULL MUT ANL BLD/T: NOT DETECTED
MLH1+MSH2+MSH6+PMS2 GN DEL+DUP+FUL M: NOT DETECTED

## 2025-02-13 PROCEDURE — 97110 THERAPEUTIC EXERCISES: CPT | Performed by: PHYSICAL THERAPIST

## 2025-02-13 NOTE — PROGRESS NOTES
Discharge    Today's date: 2025  Patient name: Arnav Woods  : 1990  MRN: [id number]  Referring provider: Wendy Phillips CRNP  Dx:   Encounter Diagnosis     ICD-10-CM    1. Lumbar back pain  M54.50       2. Pain of left hip  M25.552                      Subjective: Pt reports that he performed some of his home exercises and notes that clamshells and hip abduction was challenging. He states not realizing how weak his hip stabilizers were. He does not increased soreness in the hips in the evening with starting his new exercises.       Objective: See treatment diary below      Assessment: Provided a new set of exercises to work on. Educated pt that as with weakness, allow for rest days from hip exercises to allow for slow adaptations. Pt also has been consistent with yoga 2x/day which is helpful. Pt understands management strategies and HEP well. Pt is appropriate for discharge from skilled PT.       Plan: Discharge     Precautions: hip OA       Manuals            Hip mob  Inferior, lat distraction DL                                                  Neuro Re-Ed                                                                                                        Ther Ex             SLR HEP            Hip abduction HEP            clamshell HEP            bridge Pball HS curl Pball HS curl 2x10           Bridge march  2x5           Hip flexor stretch HEP            Lateral band walk  Grn 4 laps           Hip adduction lift             Standing fire hydrant  Quadruped HEP           U/l leg press @ gym            Reverse clamshell  W/ bolster between knees 10x                                     Ther Activity                                       Gait Training                                       Modalities

## 2025-02-18 ENCOUNTER — TELEPHONE (OUTPATIENT)
Age: 35
End: 2025-02-18

## 2025-05-29 ENCOUNTER — HOSPITAL ENCOUNTER (EMERGENCY)
Facility: HOSPITAL | Age: 35
Discharge: HOME | End: 2025-05-29
Attending: EMERGENCY MEDICINE | Admitting: EMERGENCY MEDICINE
Payer: COMMERCIAL

## 2025-05-29 VITALS
OXYGEN SATURATION: 99 % | TEMPERATURE: 97.7 F | HEART RATE: 77 BPM | SYSTOLIC BLOOD PRESSURE: 132 MMHG | RESPIRATION RATE: 20 BRPM | DIASTOLIC BLOOD PRESSURE: 76 MMHG

## 2025-05-29 DIAGNOSIS — R55 NEAR SYNCOPE: Primary | ICD-10-CM

## 2025-05-29 DIAGNOSIS — S46.911A STRAIN OF RIGHT SHOULDER, INITIAL ENCOUNTER: ICD-10-CM

## 2025-05-29 LAB
ALBUMIN SERPL BCG-MCNC: 4.7 G/DL (ref 3.5–5)
ALP SERPL-CCNC: 113 U/L (ref 34–104)
ALT SERPL W P-5'-P-CCNC: 24 U/L (ref 7–52)
ANION GAP SERPL CALCULATED.3IONS-SCNC: 5 MMOL/L (ref 4–13)
AST SERPL W P-5'-P-CCNC: 22 U/L (ref 13–39)
ATRIAL RATE: 74 BPM
BASOPHILS # BLD AUTO: 0.07 THOUSANDS/ÂΜL (ref 0–0.1)
BASOPHILS NFR BLD AUTO: 1 % (ref 0–1)
BILIRUB SERPL-MCNC: 0.4 MG/DL (ref 0.2–1)
BUN SERPL-MCNC: 12 MG/DL (ref 5–25)
CALCIUM SERPL-MCNC: 9.9 MG/DL (ref 8.4–10.2)
CARDIAC TROPONIN I PNL SERPL HS: <2 NG/L (ref ?–50)
CARDIAC TROPONIN I PNL SERPL HS: <2 NG/L (ref ?–50)
CHLORIDE SERPL-SCNC: 104 MMOL/L (ref 96–108)
CO2 SERPL-SCNC: 29 MMOL/L (ref 21–32)
CREAT SERPL-MCNC: 0.77 MG/DL (ref 0.6–1.3)
EOSINOPHIL # BLD AUTO: 0.11 THOUSAND/ÂΜL (ref 0–0.61)
EOSINOPHIL NFR BLD AUTO: 1 % (ref 0–6)
ERYTHROCYTE [DISTWIDTH] IN BLOOD BY AUTOMATED COUNT: 12.4 % (ref 11.6–15.1)
GFR SERPL CREATININE-BSD FRML MDRD: 118 ML/MIN/1.73SQ M
GLUCOSE SERPL-MCNC: 97 MG/DL (ref 65–140)
HCT VFR BLD AUTO: 43 % (ref 36.5–49.3)
HGB BLD-MCNC: 14.4 G/DL (ref 12–17)
IMM GRANULOCYTES # BLD AUTO: 0.02 THOUSAND/UL (ref 0–0.2)
IMM GRANULOCYTES NFR BLD AUTO: 0 % (ref 0–2)
LYMPHOCYTES # BLD AUTO: 1.81 THOUSANDS/ÂΜL (ref 0.6–4.47)
LYMPHOCYTES NFR BLD AUTO: 23 % (ref 14–44)
MCH RBC QN AUTO: 31.3 PG (ref 26.8–34.3)
MCHC RBC AUTO-ENTMCNC: 33.5 G/DL (ref 31.4–37.4)
MCV RBC AUTO: 94 FL (ref 82–98)
MONOCYTES # BLD AUTO: 0.59 THOUSAND/ÂΜL (ref 0.17–1.22)
MONOCYTES NFR BLD AUTO: 7 % (ref 4–12)
NEUTROPHILS # BLD AUTO: 5.36 THOUSANDS/ÂΜL (ref 1.85–7.62)
NEUTS SEG NFR BLD AUTO: 68 % (ref 43–75)
NRBC BLD AUTO-RTO: 0 /100 WBCS
P AXIS: 58 DEGREES
PLATELET # BLD AUTO: 231 THOUSANDS/UL (ref 149–390)
PMV BLD AUTO: 10.1 FL (ref 8.9–12.7)
POTASSIUM SERPL-SCNC: 4.6 MMOL/L (ref 3.5–5.3)
PR INTERVAL: 116 MS
PROT SERPL-MCNC: 7.7 G/DL (ref 6.4–8.4)
QRS AXIS: 94 DEGREES
QRSD INTERVAL: 90 MS
QT INTERVAL: 366 MS
QTC INTERVAL: 406 MS
RBC # BLD AUTO: 4.6 MILLION/UL (ref 3.88–5.62)
SODIUM SERPL-SCNC: 138 MMOL/L (ref 135–147)
T WAVE AXIS: 54 DEGREES
VENTRICULAR RATE: 74 BPM
WBC # BLD AUTO: 7.96 THOUSAND/UL (ref 4.31–10.16)

## 2025-05-29 PROCEDURE — 96361 HYDRATE IV INFUSION ADD-ON: CPT

## 2025-05-29 PROCEDURE — 85025 COMPLETE CBC W/AUTO DIFF WBC: CPT | Performed by: PHYSICIAN ASSISTANT

## 2025-05-29 PROCEDURE — 93005 ELECTROCARDIOGRAM TRACING: CPT

## 2025-05-29 PROCEDURE — 93010 ELECTROCARDIOGRAM REPORT: CPT | Performed by: INTERNAL MEDICINE

## 2025-05-29 PROCEDURE — 99284 EMERGENCY DEPT VISIT MOD MDM: CPT

## 2025-05-29 PROCEDURE — 80053 COMPREHEN METABOLIC PANEL: CPT | Performed by: PHYSICIAN ASSISTANT

## 2025-05-29 PROCEDURE — 96360 HYDRATION IV INFUSION INIT: CPT

## 2025-05-29 PROCEDURE — 84484 ASSAY OF TROPONIN QUANT: CPT | Performed by: PHYSICIAN ASSISTANT

## 2025-05-29 PROCEDURE — 99285 EMERGENCY DEPT VISIT HI MDM: CPT | Performed by: PHYSICIAN ASSISTANT

## 2025-05-29 PROCEDURE — 36415 COLL VENOUS BLD VENIPUNCTURE: CPT | Performed by: PHYSICIAN ASSISTANT

## 2025-05-29 RX ORDER — METHOCARBAMOL 500 MG/1
500 TABLET, FILM COATED ORAL 2 TIMES DAILY
Qty: 20 TABLET | Refills: 0 | Status: SHIPPED | OUTPATIENT
Start: 2025-05-29

## 2025-05-29 RX ADMIN — SODIUM CHLORIDE 1000 ML: 0.9 INJECTION, SOLUTION INTRAVENOUS at 09:51

## 2025-05-29 NOTE — ED PROVIDER NOTES
Time reflects when diagnosis was documented in both MDM as applicable and the Disposition within this note       Time User Action Codes Description Comment    5/29/2025 12:36 PM Justine Hadley Add [R55] Near syncope     5/29/2025 12:36 PM Justine Hadley Add [S46.911A] Strain of right shoulder, initial encounter           ED Disposition       ED Disposition   Discharge    Condition   Stable    Date/Time   u May 29, 2025 12:36 PM    Comment   [place] discharge to home/self care.                   Assessment & Plan       Medical Decision Making  Patient presents for evaluation after a near syncopal event while at the gym today.    Differential includes but is not limited to vasovagal syncope versus arrhythmia versus ACS versus dehydration versus neurogenic syncope.    Labs and EKG were ordered and reviewed.  Labs with no significant findings other than slightly elevated alk phos.  EKG shows no acute changes.    Patient is currently on a peptide injection (), and I did look up the side effect profile.  Lightheaded and dizziness are listed as more common side effects.  I did make him aware of this.  However, I have high suspicion for vasovagal syncope after patient exerted himself while doing squats.  He did have prodromal symptoms.  Patient had 2 negative troponins in the ED.  Low suspicion for cardiac or neurogenic etiology.    Patient did also ask me to evaluate his right shoulder.  He does have a palpable muscle spasm in the upper trapezius.  Range of motion is intact.  Strength is intact.  Recommended a course of Robaxin as well as ibuprofen with relative rest and gentle stretching exercises.  I recommended follow-up with his primary care doctor next week if no significant improvement over the weekend.      Regarding the near-syncope, I advised that the patient take it easy over the next few days.  I advised that if he has any further near syncopal events, that he should return to the  emergency department for further evaluation at that time or if he develops symptoms such as chest pain, severe headache or shortness of breath.  He acknowledged understanding.    Amount and/or Complexity of Data Reviewed  Labs: ordered. Decision-making details documented in ED Course.    Risk  Prescription drug management.        ED Course as of 05/29/25 1542   Thu May 29, 2025   1127 Patient reports feeling significantly better at this time.  He is asking for evaluation and treatment of his right shoulder pain as well.  He is agreeable to staying for a 2-hour troponin level.  All previous results discussed with patient and questions answered.   1232 hs TnI 2hr: <2       Medications   sodium chloride 0.9 % bolus 1,000 mL (0 mL Intravenous Stopped 5/29/25 1158)       ED Risk Strat Scores                    No data recorded        SBIRT 20yo+      Flowsheet Row Most Recent Value   Initial Alcohol Screen: US AUDIT-C     1. How often do you have a drink containing alcohol? 0 Filed at: 05/29/2025 0912   2. How many drinks containing alcohol do you have on a typical day you are drinking?  0 Filed at: 05/29/2025 0912   3a. Male UNDER 65: How often do you have five or more drinks on one occasion? 0 Filed at: 05/29/2025 0912   3b. FEMALE Any Age, or MALE 65+: How often do you have 4 or more drinks on one occassion? 0 Filed at: 05/29/2025 0912   Audit-C Score 0 Filed at: 05/29/2025 0912   FRANSISCO: How many times in the past year have you...    Used an illegal drug or used a prescription medication for non-medical reasons? Never Filed at: 05/29/2025 0912                            History of Present Illness       Chief Complaint   Patient presents with    Dizziness     Pt states he was at the gym and felt lightheaded/dizzy like he was going to pass out. Pt states he has been having higher BP at home.       Past Medical History[1]   Past Surgical History[2]   Family History[3]   Social History[4]   E-Cigarette/Vaping     E-Cigarette Use Former User       E-Cigarette/Vaping Substances      I have reviewed and agree with the history as documented.     The patient is a 35-year-old male with no reported past medical history who presents to the emergency department for evaluation of a near syncopal event.  The patient states he was at the gym when it occurred.  He states he completed a set of squats and went to move onto another exercise.  He states approximately 1 to 1-1/2 minutes after completing the squats, he became lightheaded.  He reports he then felt a tingling sensation in his fingers and some numbness in his mouth.  He states at that time his workout body told him he was extremely pale.  He states at that point they left, and his friend brought him to the emergency department for further evaluation.  He states that upon arrival to the ED, he did have a strange sensation in his chest, however that has since resolved.  He reports no current lightheadedness, however he does report a metallic taste in his mouth.  He states only recent medical history is a shoulder strain for which he received shockwave therapy several days ago.  He states he continues to have tension and tightness going up his right shoulder into his neck.  He states he did take a muscle relaxer for this last night, although he is uncertain of which muscle relaxer he took.  He also reports that he is currently on a peptide supplement called .  He last had an injection yesterday.  He states this was his fourth injection, and he has not had any side effects with any prior injections.  He reports occasional THC use but denies any other illicit drug use.  He states he did have 4 or 5 beers over the weekend, but he has not had any alcohol since.  He states that he drinks a lot of water.  He has no prior history of syncopal events.       History provided by:  Patient   used: No    Dizziness  Associated symptoms: no chest pain, no diarrhea, no  headaches, no nausea, no shortness of breath and no vomiting        Review of Systems   Constitutional:  Negative for chills and fever.   HENT:  Negative for ear pain and sore throat.    Eyes:  Negative for redness and visual disturbance.   Respiratory:  Negative for cough, shortness of breath and wheezing.    Cardiovascular:  Negative for chest pain.   Gastrointestinal:  Negative for abdominal pain, diarrhea, nausea and vomiting.   Genitourinary:  Negative for dysuria and hematuria.   Musculoskeletal:  Positive for arthralgias. Negative for back pain, neck pain and neck stiffness.   Skin:  Negative for color change and rash.   Neurological:  Positive for light-headedness. Negative for dizziness and headaches.   All other systems reviewed and are negative.          Objective       ED Triage Vitals [05/29/25 0910]   Temperature Pulse Blood Pressure Respirations SpO2 Patient Position - Orthostatic VS   97.7 °F (36.5 °C) 77 132/76 20 99 % --      Temp Source Heart Rate Source BP Location FiO2 (%) Pain Score    Temporal Monitor Left arm -- --      Vitals      Date and Time Temp Pulse SpO2 Resp BP Pain Score FACES Pain Rating User   05/29/25 0910 97.7 °F (36.5 °C) 77 99 % 20 132/76 -- -- JS            Physical Exam  Vitals and nursing note reviewed.   Constitutional:       General: He is not in acute distress.     Appearance: He is well-developed. He is not ill-appearing or toxic-appearing.   HENT:      Head: Normocephalic and atraumatic.     Eyes:      Pupils: Pupils are equal, round, and reactive to light.       Cardiovascular:      Rate and Rhythm: Normal rate and regular rhythm.      Heart sounds: Normal heart sounds.   Pulmonary:      Effort: Pulmonary effort is normal.      Breath sounds: Normal breath sounds.   Abdominal:      General: There is no distension.      Palpations: Abdomen is soft.      Tenderness: There is no abdominal tenderness. There is no guarding or rebound.     Musculoskeletal:      Right  shoulder: Tenderness present. Normal range of motion.      Cervical back: Normal range of motion and neck supple.      Comments: Patient has palpable muscle spasm in the right upper trapezius muscle     Skin:     General: Skin is warm and dry.     Neurological:      Mental Status: He is alert and oriented to person, place, and time.      Cranial Nerves: Cranial nerves 2-12 are intact.      Sensory: Sensation is intact.      Motor: Motor function is intact.      Coordination: Coordination is intact.         Results Reviewed       Procedure Component Value Units Date/Time    HS Troponin I 2hr [[id number]] Collected: 05/29/25 1200    Lab Status: Final result Specimen: Blood from Arm, Right Updated: 05/29/25 1231     hs TnI 2hr <2 ng/L      Delta 2hr hsTnI --    HS Troponin 0hr (reflex protocol) [[id number]]  (Normal) Collected: 05/29/25 0951    Lab Status: Final result Specimen: Blood from Arm, Right Updated: 05/29/25 1049     hs TnI 0hr <2 ng/L     Comprehensive metabolic panel [[id number]]  (Abnormal) Collected: 05/29/25 0951    Lab Status: Final result Specimen: Blood from Arm, Right Updated: 05/29/25 1034     Sodium 138 mmol/L      Potassium 4.6 mmol/L      Chloride 104 mmol/L      CO2 29 mmol/L      ANION GAP 5 mmol/L      BUN 12 mg/dL      Creatinine 0.77 mg/dL      Glucose 97 mg/dL      Calcium 9.9 mg/dL      AST 22 U/L      ALT 24 U/L      Alkaline Phosphatase 113 U/L      Total Protein 7.7 g/dL      Albumin 4.7 g/dL      Total Bilirubin 0.40 mg/dL      eGFR 118 ml/min/1.73sq m     Narrative:      [employer] guidelines for Chronic Kidney Disease (CKD):     Stage 1 with normal or high GFR (GFR > 90 mL/min/1.73 square meters)    Stage 2 Mild CKD (GFR = 60-89 mL/min/1.73 square meters)    Stage 3A Moderate CKD (GFR = 45-59 mL/min/1.73 square meters)    Stage 3B Moderate CKD (GFR = 30-44 mL/min/1.73 square meters)    Stage 4 Severe CKD (GFR = 15-29 mL/min/1.73 square meters)    Stage 5 End  Stage CKD (GFR <15 mL/min/1.73 square meters)  Note: GFR calculation is accurate only with a steady state creatinine    CBC and differential [[id number]] Collected: 05/29/25 0951    Lab Status: Final result Specimen: Blood from Arm, Right Updated: 05/29/25 1015     WBC 7.96 Thousand/uL      RBC 4.60 Million/uL      Hemoglobin 14.4 g/dL      Hematocrit 43.0 %      MCV 94 fL      MCH 31.3 pg      MCHC 33.5 g/dL      RDW 12.4 %      MPV 10.1 fL      Platelets 231 Thousands/uL      nRBC 0 /100 WBCs      Segmented % 68 %      Immature Grans % 0 %      Lymphocytes % 23 %      Monocytes % 7 %      Eosinophils Relative 1 %      Basophils Relative 1 %      Absolute Neutrophils 5.36 Thousands/µL      Absolute Immature Grans 0.02 Thousand/uL      Absolute Lymphocytes 1.81 Thousands/µL      Absolute Monocytes 0.59 Thousand/µL      Eosinophils Absolute 0.11 Thousand/µL      Basophils Absolute 0.07 Thousands/µL             No orders to display       ECG 12 Lead Documentation Only    Date/Time: 5/29/2025 3:21 PM    Performed by: Justine Hadley PA-C  Authorized by: Justine Hadley PA-C    Comments:      Normal sinus rhythm at 74.  Right axis deviation.  No acute ST-T changes.  No significant change noted from previous.      ED Medication and Procedure Management   None     Discharge Medication List as of 5/29/2025 12:37 PM        START taking these medications    Details   methocarbamol (ROBAXIN) 500 mg tablet Take 1 tablet (500 mg total) by mouth 2 (two) times a day, Starting Thu 5/29/2025, Normal           No discharge procedures on file.  ED SEPSIS DOCUMENTATION   Time reflects when diagnosis was documented in both MDM as applicable and the Disposition within this note       Time User Action Codes Description Comment    5/29/2025 12:36 PM Justine Hadley Add [R55] Near syncope     5/29/2025 12:36 PM Justine Hadley Add [S46.911A] Strain of right shoulder, initial encounter                    [1]   Past Medical  History:  Diagnosis Date    Anxiety     Lyme disease    [2]   Past Surgical History:  Procedure Laterality Date    NO PAST SURGERIES     [3]   Family History  Problem Relation Name Age of Onset    No Known Problems Mother      Schizophrenia Father     [4]   Social History  Tobacco Use    Smoking status: Never     Passive exposure: Never    Smokeless tobacco: Never   Vaping Use    Vaping status: Former   Substance Use Topics    Alcohol use: Not Currently     Comment: former    Drug use: Not Currently        Justine Hadley PA-C  05/29/25 4793